# Patient Record
Sex: FEMALE | Race: WHITE | NOT HISPANIC OR LATINO | Employment: FULL TIME | URBAN - METROPOLITAN AREA
[De-identification: names, ages, dates, MRNs, and addresses within clinical notes are randomized per-mention and may not be internally consistent; named-entity substitution may affect disease eponyms.]

---

## 2023-04-30 ENCOUNTER — APPOINTMENT (OUTPATIENT)
Dept: RADIOLOGY | Facility: CLINIC | Age: 43
End: 2023-04-30

## 2023-04-30 ENCOUNTER — OFFICE VISIT (OUTPATIENT)
Dept: URGENT CARE | Facility: CLINIC | Age: 43
End: 2023-04-30

## 2023-04-30 VITALS
HEART RATE: 96 BPM | BODY MASS INDEX: 33.9 KG/M2 | WEIGHT: 216 LBS | HEIGHT: 67 IN | SYSTOLIC BLOOD PRESSURE: 121 MMHG | RESPIRATION RATE: 18 BRPM | TEMPERATURE: 98.8 F | DIASTOLIC BLOOD PRESSURE: 70 MMHG | OXYGEN SATURATION: 99 %

## 2023-04-30 DIAGNOSIS — M25.512 ACUTE PAIN OF LEFT SHOULDER: ICD-10-CM

## 2023-04-30 DIAGNOSIS — M25.512 ACUTE PAIN OF LEFT SHOULDER: Primary | ICD-10-CM

## 2023-04-30 DIAGNOSIS — M75.32 CALCIFIC TENDINITIS OF LEFT SHOULDER: ICD-10-CM

## 2023-04-30 RX ORDER — FLUOXETINE HYDROCHLORIDE 40 MG/1
CAPSULE ORAL
COMMUNITY
Start: 2022-12-23

## 2023-04-30 RX ORDER — NAPROXEN 500 MG/1
500 TABLET ORAL 2 TIMES DAILY WITH MEALS
Qty: 20 TABLET | Refills: 0 | Status: SHIPPED | OUTPATIENT
Start: 2023-04-30

## 2023-04-30 RX ORDER — ZIPRASIDONE HYDROCHLORIDE 40 MG/1
40 CAPSULE ORAL 2 TIMES DAILY
COMMUNITY
Start: 2023-04-13

## 2023-04-30 RX ORDER — BUPROPION HYDROCHLORIDE 150 MG/1
TABLET ORAL
COMMUNITY
Start: 2023-04-17

## 2023-04-30 RX ORDER — MIRTAZAPINE 15 MG/1
0.5 TABLET, FILM COATED ORAL
COMMUNITY
Start: 2022-12-22

## 2023-04-30 RX ORDER — PREDNISONE 10 MG/1
TABLET ORAL
Qty: 21 TABLET | Refills: 0 | Status: SHIPPED | OUTPATIENT
Start: 2023-04-30

## 2023-04-30 RX ORDER — KETOROLAC TROMETHAMINE 30 MG/ML
30 INJECTION, SOLUTION INTRAMUSCULAR; INTRAVENOUS ONCE
Status: COMPLETED | OUTPATIENT
Start: 2023-04-30 | End: 2023-04-30

## 2023-04-30 RX ORDER — ALPRAZOLAM 0.5 MG/1
0.5 TABLET ORAL DAILY PRN
COMMUNITY
Start: 2023-04-23

## 2023-04-30 RX ADMIN — KETOROLAC TROMETHAMINE 30 MG: 30 INJECTION, SOLUTION INTRAMUSCULAR; INTRAVENOUS at 13:13

## 2023-04-30 NOTE — PATIENT INSTRUCTIONS
Take the prednisone with food in the morning  Take the naproxen as needed for pain with food  Follow-up with the orthopedic

## 2023-04-30 NOTE — PROGRESS NOTES
[facility]        NAME: Harshad Monet is a 40 y o  female  : 1980    MRN: [id number]  DATE: 2023  TIME: 1:51 PM    Assessment and Plan   Acute pain of left shoulder [M25 512]  1  Acute pain of left shoulder  XR shoulder 2+ vw left    ketorolac (TORADOL) injection 30 mg    predniSONE 10 mg tablet    naproxen (EC NAPROSYN) 500 MG EC tablet    Ambulatory Referral to Orthopedic Surgery      2  Calcific tendinitis of left shoulder          X-ray interpreted by myself  Calcific tendinitis  Pending radiology review  Patient Instructions     Take the prednisone with food in the morning  Take the naproxen as needed for pain with food  Follow-up with the orthopedic  Follow up with PCP in 3-5 days  Proceed to  ER if symptoms worsen  Chief Complaint     Chief Complaint   Patient presents with    Shoulder Pain     Left shoulder pain starting 3 days ago; unable to lift left arm due to pain; denies any injury to the area         History of Present Illness       Patient is a [phone] presenting with left shoulder pain worsening over the last 3 days  She denies any injury but states she has been doing a lot of lifting lately  Shoulder Pain         Review of Systems   Review of Systems   Respiratory: Negative for shortness of breath  Cardiovascular: Negative for chest pain  Musculoskeletal: Positive for arthralgias  All other systems reviewed and are negative          Current Medications       Current Outpatient Medications:     ALPRAZolam (XANAX) 0 5 mg tablet, Take 0 5 mg by mouth daily as needed, Disp: , Rfl:     buPROPion (WELLBUTRIN XL) 150 mg 24 hr tablet, , Disp: , Rfl:     FLUoxetine (PROzac) 40 MG capsule, TAKE 2 CAPSULES BY MOUTH IN THE MORNING, Disp: , Rfl:     mirtazapine (REMERON) 15 mg tablet, Take 0 5 tablets by mouth daily at bedtime, Disp: , Rfl:     naproxen (EC NAPROSYN) 500 MG EC tablet, Take 1 tablet (500 mg total) by mouth 2 (two) times a day with meals, Disp: "20 tablet, Rfl: 0    predniSONE 10 mg tablet, Take 6 pills day 1, then 5 pills day 2, 4 pills day 3, 3 pills day 4, 2 pills day 5, 1 pill day 6,, Disp: 21 tablet, Rfl: 0    ziprasidone (GEODON) 40 mg capsule, Take 40 mg by mouth 2 (two) times a day, Disp: , Rfl:   No current facility-administered medications for this visit  Current Allergies     Allergies as of 04/30/2023 - Reviewed 04/30/2023   Allergen Reaction Noted    Penicillins Hives 05/20/2019            The following portions of the patient's history were reviewed and updated as appropriate: allergies, current medications, past family history, past medical history, past social history, past surgical history and problem list      Past Medical History:   Diagnosis Date    Anxiety     Depression     Panic disorder     PTSD (post-traumatic stress disorder)        History reviewed  No pertinent surgical history  History reviewed  No pertinent family history  Medications have been verified  Objective   /70   Pulse 96   Temp 98 8 °F (37 1 °C)   Resp 18   Ht 5' 7\" (1 702 m)   Wt 98 kg (216 lb)   LMP 04/03/2023 (Within Days)   SpO2 99%   BMI 33 83 kg/m²        Physical Exam     Physical Exam  Vitals and nursing note reviewed  Constitutional:       General: She is not in acute distress  Appearance: Normal appearance  She is normal weight  She is not ill-appearing or toxic-appearing  HENT:      Mouth/Throat:      Pharynx: Oropharynx is clear  Cardiovascular:      Rate and Rhythm: Normal rate and regular rhythm  Pulses: Normal pulses  Heart sounds: Normal heart sounds  Pulmonary:      Effort: Pulmonary effort is normal       Breath sounds: Normal breath sounds  Musculoskeletal:      Left shoulder: Tenderness (along the ac joint ) present  No swelling or deformity  Decreased range of motion  Normal strength  Normal pulse  Neurological:      Mental Status: She is alert                     "

## 2023-04-30 NOTE — LETTER
April 30, 2023     Patient: Harshad Monet   YOB: 1980   Date of Visit: 4/30/2023       To Whom it May Concern:    Harshad Monet was seen in my clinic on 4/30/2023  Please excuse from work on 5/1/2023-5/3/2023  She may return to work on 5/4/2023  If you have any questions or concerns, please don't hesitate to call           Sincerely,          DAVON Caputo        CC: No Recipients

## 2023-05-03 ENCOUNTER — OFFICE VISIT (OUTPATIENT)
Dept: OBGYN CLINIC | Facility: CLINIC | Age: 43
End: 2023-05-03

## 2023-05-03 VITALS
TEMPERATURE: 98.3 F | HEART RATE: 89 BPM | DIASTOLIC BLOOD PRESSURE: 89 MMHG | BODY MASS INDEX: 34.21 KG/M2 | HEIGHT: 67 IN | WEIGHT: 218 LBS | SYSTOLIC BLOOD PRESSURE: 130 MMHG

## 2023-05-03 DIAGNOSIS — M75.82 ROTATOR CUFF TENDINITIS, LEFT: Primary | ICD-10-CM

## 2023-05-03 NOTE — PROGRESS NOTES
ASSESSMENT/PLAN:    Diagnoses and all orders for this visit:    Rotator cuff tendinitis, left  -     Ambulatory Referral to Physical Therapy; Future        Reviewed physical exam with patient on today's visit  Her symptoms are consistent with rotator cuff tendinitis/impingement syndrome  Offered a cortisone injection for symptomatic relief and inflammation, however, the patient declined at this time  Referral sent for physical therapy to improve range of motion, scapular strengthening, and pain modulation  She will follow-up in 3-4 weeks for re-evaluation  The patient expresses understanding and is in agreement with today's treatment plan  Return 3 to 4 weeks  _____________________________________________________  CHIEF COMPLAINT:  Chief Complaint   Patient presents with    Left Shoulder - Pain         SUBJECTIVE:  Suze Davey is a 40y o  year old female who presents today with left shoulder pain  She is right-hand dominant  The patient reported that her pain began on 4/27/23  She states that she worked consecutive, long shifts in the days leading up to the onset of pain  She works as a  at [employer], which requires repetitive lifting  She describes the pain as achy and chronic  The pain is present when she moves her shoulder and it increases with overhead motions  On 4/30/23, she reported to Urgent Care where imaging was taken  She was prescribed Prednisone and Naproxen  She stated that the Prednisone has helped, but she did not experience relief with the Naproxen  On today's visit, she is still experiencing pain in the front and side of her shoulder  She states that her range of motion has improved  She denies numbness, tingling, weakness, and feelings of instability  PAST MEDICAL HISTORY:  Past Medical History:   Diagnosis Date    Anxiety     Depression     Panic disorder     PTSD (post-traumatic stress disorder)        PAST SURGICAL HISTORY:  History reviewed   No pertinent surgical history  FAMILY HISTORY:  History reviewed  No pertinent family history  SOCIAL HISTORY:  Social History     Tobacco Use    Smoking status: Every Day     Packs/day: 0 50     Types: Cigarettes    Smokeless tobacco: Never   Vaping Use    Vaping Use: Never used   Substance Use Topics    Alcohol use: Not Currently    Drug use: Not Currently       MEDICATIONS:    Current Outpatient Medications:     ALPRAZolam (XANAX) 0 5 mg tablet, Take 0 5 mg by mouth daily as needed, Disp: , Rfl:     buPROPion (WELLBUTRIN XL) 150 mg 24 hr tablet, , Disp: , Rfl:     FLUoxetine (PROzac) 40 MG capsule, TAKE 2 CAPSULES BY MOUTH IN THE MORNING, Disp: , Rfl:     mirtazapine (REMERON) 15 mg tablet, Take 0 5 tablets by mouth daily at bedtime, Disp: , Rfl:     naproxen (EC NAPROSYN) 500 MG EC tablet, Take 1 tablet (500 mg total) by mouth 2 (two) times a day with meals, Disp: 20 tablet, Rfl: 0    predniSONE 10 mg tablet, Take 6 pills day 1, then 5 pills day 2, 4 pills day 3, 3 pills day 4, 2 pills day 5, 1 pill day 6,, Disp: 21 tablet, Rfl: 0    ziprasidone (GEODON) 40 mg capsule, Take 40 mg by mouth 2 (two) times a day, Disp: , Rfl:     ALLERGIES:  Allergies   Allergen Reactions    Penicillins Hives       Review of systems:   Constitutional: Negative for fatigue, fever or loss of apetite  HENT: Negative  Respiratory: Negative for shortness of breath, dyspnea  Cardiovascular: Negative for chest pain/tightness  Gastrointestinal: Negative for abdominal pain, N/V  Endocrine: Negative for cold/heat intolerance, unexplained weight loss/gain  Genitourinary: Negative for flank pain, dysuria, hematuria  Musculoskeletal: As noted in HPI  Skin: Negative for rash  Neurological: Negative  Psychiatric/Behavioral: Negative for agitation    _____________________________________________________  PHYSICAL EXAMINATION:    Blood pressure 130/89, pulse 89, temperature 98 3 °F (36 8 °C), temperature source "Temporal, height 5' 7\" (1 702 m), weight 98 9 kg (218 lb), last menstrual period 04/03/2023  General: well developed and well nourished, alert, oriented times 3 and appears comfortable  Psychiatric: Normal  HEENT: Benign  Cardiovascular: Regular    Pulmonary: No wheezing or stridor  Abdomen: Soft, Nontender  Skin: No masses, erythema, lacerations, fluctation, ulcerations  Neurovascular: Sensory and Motor Intact    MUSCULOSKELETAL EXAMINATION:    left Shoulder -   No anatomical deformity  Skin is warm and dry to touch with no signs of erythema, ecchymosis, or infection  No  soft tissue swelling or effusion noted  nontender over biceps tendon and bicipital groove, subacromial bursa, and supraspinatus although she admits pain is present at the biceps tendon and the rotator cuff insertion but not altered by palpation  ROM FF 0-160, ABD 0-180, ER 0-90, IR Anterior Abdominal Wall  Pain with active FF and ABD at and above shoulder height  MMT: 5/5 throughout, pain with resisted FF, ABD, IR   No  glenohumeral instability appreciated on exam  - Neer's, - Slater  - Speed's, - Yergason's  -  empty can, - drop-arm, - belly press, - resisted external rotation, - lift-off  Demonstrates normal elbow, wrist, and finger motion  2+  distal radial pulse with brisk capillary refill to the fingers  Radial, median, ulnar and motor  and sensory distribution intact  Sensation to light touch intact distally      _____________________________________________________  STUDIES REVIEWED:  X-rays of the shoulder obtained 4/30/2023 demonstrate no acute injury  There is prominence of the acromion with a small spur noted projecting distally  The report was reviewed  The Latonya note was reviewed        Madhu Neely    "

## 2023-10-21 ENCOUNTER — HOSPITAL ENCOUNTER (EMERGENCY)
Facility: HOSPITAL | Age: 43
Discharge: HOME | End: 2023-10-21
Attending: STUDENT IN AN ORGANIZED HEALTH CARE EDUCATION/TRAINING PROGRAM
Payer: COMMERCIAL

## 2023-10-21 VITALS
DIASTOLIC BLOOD PRESSURE: 88 MMHG | OXYGEN SATURATION: 96 % | HEART RATE: 102 BPM | SYSTOLIC BLOOD PRESSURE: 148 MMHG | RESPIRATION RATE: 20 BRPM | TEMPERATURE: 96.8 F

## 2023-10-21 DIAGNOSIS — Z76.0 ENCOUNTER FOR MEDICATION REFILL: Primary | ICD-10-CM

## 2023-10-21 PROCEDURE — 99284 EMERGENCY DEPT VISIT MOD MDM: CPT | Performed by: STUDENT IN AN ORGANIZED HEALTH CARE EDUCATION/TRAINING PROGRAM

## 2023-10-21 PROCEDURE — 99281 EMR DPT VST MAYX REQ PHY/QHP: CPT

## 2023-10-21 RX ORDER — ZIPRASIDONE HYDROCHLORIDE 40 MG/1
40 CAPSULE ORAL 2 TIMES DAILY WITH MEALS
Qty: 60 CAPSULE | Refills: 0 | Status: SHIPPED | OUTPATIENT
Start: 2023-10-21 | End: 2023-11-20

## 2023-10-21 NOTE — DISCHARGE INSTRUCTIONS
The Geodon prescription was sent to the pharmacy. Please take as directed. Return to the ED for any concerning signs or symptoms.

## 2023-10-21 NOTE — ED PROVIDER NOTES
History  Chief Complaint   Patient presents with    Medication Refill     Pt presents to ER for medication refill for geodon 40mg. States she previously got it from psychiatrist, but moved and had been unable to locate a new one. Has been off medication for 5 days. History provided by:  Patient, medical records and spouse  Medication Refill  Medications/supplies requested:  Geodon 40 mg BID. Has been without medication x 5 days. Reason for request:  Clinic/provider not available  Medications taken before: yes - see home medications    Patient has complete original prescription information: yes      Past Medical History:   Diagnosis Date    Anxiety     Depression     Panic disorder     PTSD (post-traumatic stress disorder)        History reviewed. No pertinent surgical history. History reviewed. No pertinent family history. I have reviewed and agree with the history as documented. E-Cigarette/Vaping    E-Cigarette Use Never User      E-Cigarette/Vaping Substances     Social History     Tobacco Use    Smoking status: Every Day     Packs/day: 0.50     Types: Cigarettes    Smokeless tobacco: Never   Vaping Use    Vaping Use: Never used   Substance Use Topics    Alcohol use: Not Currently    Drug use: Not Currently       Review of Systems   Psychiatric/Behavioral:  Positive for dysphoric mood. Negative for agitation, confusion, decreased concentration and suicidal ideas. The patient is nervous/anxious. All other systems reviewed and are negative. Physical Exam  Physical Exam  Vitals and nursing note reviewed. Exam conducted with a chaperone present. Constitutional:       General: She is not in acute distress. Appearance: She is not ill-appearing or toxic-appearing. HENT:      Head: Normocephalic and atraumatic. Right Ear: External ear normal.      Left Ear: External ear normal.   Eyes:      General: No scleral icterus. Right eye: No discharge. Left eye: No discharge. Extraocular Movements: Extraocular movements intact. Conjunctiva/sclera: Conjunctivae normal.   Cardiovascular:      Rate and Rhythm: Normal rate and regular rhythm. Pulses: Normal pulses. Heart sounds: Normal heart sounds. No murmur heard. Pulmonary:      Effort: Pulmonary effort is normal. No respiratory distress. Breath sounds: Normal breath sounds. No stridor. No wheezing, rhonchi or rales. Chest:      Chest wall: No tenderness. Skin:     General: Skin is warm. Capillary Refill: Capillary refill takes less than 2 seconds. Neurological:      General: No focal deficit present. Mental Status: She is alert and oriented to person, place, and time. Psychiatric:         Mood and Affect: Mood is depressed. Affect is flat and tearful. Vital Signs  ED Triage Vitals [10/21/23 1642]   Temperature Pulse Respirations Blood Pressure SpO2   (!) 96.8 °F (36 °C) 102 20 (!) 155/121 96 %      Temp Source Heart Rate Source Patient Position - Orthostatic VS BP Location FiO2 (%)   Temporal Monitor Sitting Right arm --      Pain Score       --           Vitals:    10/21/23 1642 10/21/23 1654   BP: (!) 155/121 148/88   Pulse: 102    Patient Position - Orthostatic VS: Sitting          Visual Acuity      ED Medications  Medications - No data to display    Diagnostic Studies  Results Reviewed       None                   No orders to display              Procedures  Procedures         ED Course                               SBIRT 20yo+      Flowsheet Row Most Recent Value   Initial Alcohol Screen: US AUDIT-C     1. How often do you have a drink containing alcohol? 0 Filed at: 10/21/2023 1641   2. How many drinks containing alcohol do you have on a typical day you are drinking? 0 Filed at: 10/21/2023 1641   3b. FEMALE Any Age, or MALE 65+: How often do you have 4 or more drinks on one occassion?  0 Filed at: 10/21/2023 1641   Audit-C Score 0 Filed at: 10/21/2023 1641   LURDES: How many times in the past year have you. .. Used an illegal drug or used a prescription medication for non-medical reasons? Never Filed at: 10/21/2023 1641                      Medical Decision Making  The differential diagnoses include but are not limited to depression, anxiety, mood disorder  Vital signs reviewed. Denies homicidal/suicidal ideations, visual/auditory hallucinations. Geodon prescription sent to the patient's pharmacy. A list of local mental health providers provided. Stable for discharge. Problems Addressed:  Encounter for medication refill: acute illness or injury    Amount and/or Complexity of Data Reviewed  External Data Reviewed: notes. Risk  Prescription drug management. Disposition  Final diagnoses:   Encounter for medication refill     Time reflects when diagnosis was documented in both MDM as applicable and the Disposition within this note       Time User Action Codes Description Comment    10/21/2023  4:50 PM Ceclia Sever Add [Z76.0] Encounter for medication refill           ED Disposition       ED Disposition   Discharge    Condition   Stable    Date/Time   Sat Oct 21, 2023 1650    Comment   [facility] discharge to home/self care. Follow-up Information    None         Patient's Medications   Discharge Prescriptions    ZIPRASIDONE (GEODON) 40 MG CAPSULE    Take 1 capsule (40 mg total) by mouth 2 (two) times a day with meals       Start Date: 10/21/2023End Date: 11/20/2023       Order Dose: 40 mg       Quantity: 60 capsule    Refills: 0       No discharge procedures on file.     PDMP Review       None            ED Provider  Electronically Signed by             Marbella Doe DO  10/21/23 [id number]

## 2024-07-02 ENCOUNTER — HOSPITAL ENCOUNTER (EMERGENCY)
Facility: HOSPITAL | Age: 44
Discharge: HOME | End: 2024-07-02
Attending: EMERGENCY MEDICINE
Payer: COMMERCIAL

## 2024-07-02 ENCOUNTER — APPOINTMENT (EMERGENCY)
Dept: RADIOLOGY | Facility: HOSPITAL | Age: 44
End: 2024-07-02
Payer: COMMERCIAL

## 2024-07-02 VITALS
SYSTOLIC BLOOD PRESSURE: 170 MMHG | OXYGEN SATURATION: 96 % | BODY MASS INDEX: 37.59 KG/M2 | HEART RATE: 110 BPM | WEIGHT: 240 LBS | TEMPERATURE: 99.9 F | DIASTOLIC BLOOD PRESSURE: 109 MMHG | RESPIRATION RATE: 18 BRPM

## 2024-07-02 DIAGNOSIS — S93.409A ANKLE SPRAIN: Primary | ICD-10-CM

## 2024-07-02 PROCEDURE — 73610 X-RAY EXAM OF ANKLE: CPT

## 2024-07-02 PROCEDURE — 99284 EMERGENCY DEPT VISIT MOD MDM: CPT | Performed by: EMERGENCY MEDICINE

## 2024-07-02 NOTE — ED PROVIDER NOTES
History  Chief Complaint   Patient presents with    Ankle Pain     Reports no trauma and just started hurting that started last night and looks like it is bruised, been taking APAP and motrin without relief.      Patient complains of 2 days of left ankle pain.  States woke up yesterday with left ankle pain and swelling.  Denies any direct trauma but states she does do a lot of walking at work.      Ankle Pain  Associated symptoms: swelling    Associated symptoms: no decreased ROM, no fever, no itching, no muscle weakness, no neck pain, no numbness, no stiffness and no tingling    Leg Pain  Location:  Ankle  Time since incident:  2 days  Ankle location:  L ankle  Pain details:     Quality:  Aching    Radiates to:  Does not radiate    Severity:  Moderate    Onset quality:  Gradual    Duration:  2 days    Timing:  Constant    Progression:  Unchanged  Chronicity:  New  Relieved by:  Nothing  Worsened by:  Nothing  Ineffective treatments:  None tried  Associated symptoms: swelling    Associated symptoms: no decreased ROM, no fever, no itching, no muscle weakness, no neck pain, no numbness, no stiffness and no tingling        Prior to Admission Medications   Prescriptions Last Dose Informant Patient Reported? Taking?   ALPRAZolam (XANAX) 0.5 mg tablet   Yes No   Sig: Take 0.5 mg by mouth daily as needed   FLUoxetine (PROzac) 40 MG capsule   Yes No   Sig: TAKE 2 CAPSULES BY MOUTH IN THE MORNING   buPROPion (WELLBUTRIN XL) 150 mg 24 hr tablet   Yes No   mirtazapine (REMERON) 15 mg tablet   Yes No   Sig: Take 0.5 tablets by mouth daily at bedtime   naproxen (EC NAPROSYN) 500 MG EC tablet   No No   Sig: Take 1 tablet (500 mg total) by mouth 2 (two) times a day with meals   predniSONE 10 mg tablet   No No   Sig: Take 6 pills day 1, then 5 pills day 2, 4 pills day 3, 3 pills day 4, 2 pills day 5, 1 pill day 6,   ziprasidone (GEODON) 40 mg capsule   Yes No   Sig: Take 40 mg by mouth 2 (two) times a day   ziprasidone (GEODON) 40  mg capsule   No No   Sig: Take 1 capsule (40 mg total) by mouth 2 (two) times a day with meals      Facility-Administered Medications: None       Past Medical History:   Diagnosis Date    Anxiety     Depression     Panic disorder     PTSD (post-traumatic stress disorder)        History reviewed. No pertinent surgical history.    History reviewed. No pertinent family history.  I have reviewed and agree with the history as documented.    E-Cigarette/Vaping    E-Cigarette Use Never User      E-Cigarette/Vaping Substances     Social History     Tobacco Use    Smoking status: Every Day     Current packs/day: 0.50     Types: Cigarettes    Smokeless tobacco: Never   Vaping Use    Vaping status: Never Used   Substance Use Topics    Alcohol use: Not Currently    Drug use: Not Currently       Review of Systems   Constitutional:  Negative for chills and fever.   HENT:  Negative for ear pain, hearing loss, sore throat, trouble swallowing and voice change.    Eyes:  Negative for pain and discharge.   Respiratory:  Negative for cough, shortness of breath and wheezing.    Cardiovascular:  Negative for chest pain and palpitations.   Gastrointestinal:  Negative for abdominal pain, blood in stool, constipation, diarrhea, nausea and vomiting.   Genitourinary:  Negative for dysuria, flank pain, frequency and hematuria.   Musculoskeletal:  Negative for joint swelling, neck pain, neck stiffness and stiffness.   Skin:  Negative for itching, rash and wound.   Neurological:  Negative for dizziness, seizures, syncope, facial asymmetry and headaches.   Psychiatric/Behavioral:  Negative for hallucinations, self-injury and suicidal ideas.    All other systems reviewed and are negative.      Physical Exam  Physical Exam  Vitals and nursing note reviewed.   Constitutional:       General: She is not in acute distress.     Appearance: She is well-developed. She is not ill-appearing or diaphoretic.   HENT:      Head: Normocephalic and atraumatic.       Right Ear: External ear normal.      Left Ear: External ear normal.   Eyes:      General: No scleral icterus.        Right eye: No discharge.         Left eye: No discharge.      Extraocular Movements: Extraocular movements intact.      Conjunctiva/sclera: Conjunctivae normal.   Pulmonary:      Effort: Pulmonary effort is normal. No respiratory distress.   Musculoskeletal:         General: No deformity or signs of injury. Normal range of motion.      Cervical back: Normal range of motion and neck supple.      Comments: Some swelling over the lateral soft tissues of the left ankle.  No bony tenderness.  No significant redness or warmth.  Dorsalis pedis and posterior tibialis pulses are normal.  There is some soft tissue tenderness consistent with strain/sprain.   Skin:     General: Skin is warm and dry.      Coloration: Skin is not jaundiced or pale.   Neurological:      General: No focal deficit present.      Mental Status: She is alert and oriented to person, place, and time.      Cranial Nerves: No cranial nerve deficit.      Coordination: Coordination normal.      Gait: Gait normal.   Psychiatric:         Mood and Affect: Mood normal.         Behavior: Behavior normal.         Thought Content: Thought content normal.         Judgment: Judgment normal.         Vital Signs  ED Triage Vitals   Temperature Pulse Respirations Blood Pressure SpO2   07/02/24 1551 07/02/24 1551 07/02/24 1551 07/02/24 1553 07/02/24 1551   99.9 °F (37.7 °C) (!) 110 18 (!) 170/109 96 %      Temp Source Heart Rate Source Patient Position - Orthostatic VS BP Location FiO2 (%)   07/02/24 1551 07/02/24 1551 07/02/24 1553 07/02/24 1553 --   Temporal Monitor Sitting Left arm       Pain Score       07/02/24 1551       9           Vitals:    07/02/24 1551 07/02/24 1553   BP:  (!) 170/109   Pulse: (!) 110    Patient Position - Orthostatic VS:  Sitting         Visual Acuity      ED Medications  Medications - No data to display    Diagnostic  Studies  Results Reviewed       None                   XR ankle 3+ views LEFT   ED Interpretation by Brian Middleton MD (07/02 7981)   No fracture                 Procedures  Procedures         ED Course                               SBIRT 22yo+      Flowsheet Row Most Recent Value   Initial Alcohol Screen: US AUDIT-C     1. How often do you have a drink containing alcohol? 0 Filed at: 07/02/2024 1553   2. How many drinks containing alcohol do you have on a typical day you are drinking?  0 Filed at: 07/02/2024 1553   3b. FEMALE Any Age, or MALE 65+: How often do you have 4 or more drinks on one occassion? 0 Filed at: 07/02/2024 1553   Audit-C Score 0 Filed at: 07/02/2024 1553   LURDES: How many times in the past year have you...    Used an illegal drug or used a prescription medication for non-medical reasons? Never Filed at: 07/02/2024 1553                      Medical Decision Making  Based on the history and medical screening exam performed the diagnostic considerations include but are not limited to ankle sprain, ankle sprain, ankle fracture, dependent edema.    Based on the work-up performed in the emergency room which includes physical examination, and which may include laboratory studies and imaging as warranted including advanced imaging such as CT scan or ultrasound, the diagnostic considerations are narrowed to exclude limb or life-threatening process.    The patient is stable for discharge.  Exam most consistent with ankle sprain and ankle x-rays negative    Amount and/or Complexity of Data Reviewed  Radiology: ordered and independent interpretation performed. Decision-making details documented in ED Course.     Details: Ankle x-ray negative             Disposition  Final diagnoses:   Ankle sprain     Time reflects when diagnosis was documented in both MDM as applicable and the Disposition within this note       Time User Action Codes Description Comment    7/2/2024  4:10 PM Brian Middleton Add  [S93.409A] Ankle sprain           ED Disposition       ED Disposition   Discharge    Condition   Stable    Date/Time   Tue Jul 2, 2024 1610    Comment   Naya Cannon discharge to home/self care.                   Follow-up Information       Follow up With Specialties Details Why Contact Info    Loli Casiano, DO Family Medicine   [address] [facility] [id number] [phone]      [facility]  As needed [address] [facility]  [address] [place] [id number] [phone]              Patient's Medications   Discharge Prescriptions    No medications on file       No discharge procedures on file.    PDMP Review       None            ED Provider  Electronically Signed by             Brian Middleton MD  07/02/24 2855